# Patient Record
Sex: FEMALE | Race: WHITE | Employment: OTHER | URBAN - METROPOLITAN AREA
[De-identification: names, ages, dates, MRNs, and addresses within clinical notes are randomized per-mention and may not be internally consistent; named-entity substitution may affect disease eponyms.]

---

## 2023-01-09 NOTE — PROGRESS NOTES
Ancelmo Fisher  [address], [address] [facility], [address] [place] [address]  Phone: [phone] Fax [phone]  Yg Garcia MD      Patient: Swetha Gordillo  Provider: Yg Garcia MD    CC:   Establish Care  Parkinson's Disease  Chronic Inflammatory Demyelinating Polyneuritis       Referring Provider: Dr. Leonidas Bowen    History of Present Illness:     Swetha Gordillo is a 74 y.o. female who presents to the clinic to establish care for Parkinson's Disease (2016) and Chronic Inflammatory Demyelinating Polyneuritis (2018) as stated on the referral. Upon further review, the patient also carries the following diagnoses: disorder of the autonomic nervous system (2020), muscle weakness, REM sleep behavior disorder (2020), neuropathy (2016), depressive disorder (2016), neurogenic bladder (2020), and impaired cognition. The patient and  report that a DaTscan was previously completed, but unable to provide specifics (date, year, facility) related to the scan. Most recently, her neurologist was Dr. Ronnie Houston at [facility]. Her last visit with him appears to have been on 11/22/2021. Dr. Leonidas Bowen with [facility] has most recently been managing her care and referred the patient to this clinic for evaluation and treatment. She is accompanied by her  at today's visit. The patient reports that her brother and half sister have been diagnosed with seizures, but denies other neurological or chronic health conditions in the family. Patient and  deny any changes in cognition/memory. There is evidence of impaired cognition and awareness. The patient's  reports episodes of impulsivity (making purchases/spending money on unnecessary items); especially at night. The patient denies any memory or awareness of this behavior.  Behavior modification strategies were discussed, but patient and  were not receptive to making any changes at this time. The patient and  reported an increase in anxiety and depression recently, but overall well managed with her current medication. Cognitive behavioral therapy was discussed at length, but declined a referral at this time. Negative assessment for threat to hurt self or others. Patient denies hallucinations. The patient reports having very vivid dreams. Her  reports that she does talk in her sleep and she appears to be acting out her dreams. They report that when this happens, he wakes her up without difficulty. Denies any falls from bed to floor during sleeping hours. The patient and  report a total of 3 falls within the past 12 months; 2 resulting in injury (broken toe and sutures to one toe). On all 3 occasions, the patient was in the bathroom alone. The patient is wheelchair bound. There is muscle atrophy, weakness, and rigidity. There is minimal tremor present at rest; however, R>L. With intentional movements, distraction, or when stressful/upsetting topics are discussed, there are dyskinetic movements involving her head, neck, face, and jaw. She also stated that she has been experiencing random, but painful muscle spasms/cramps. When these occur an OTC topical cream (unsure of the name) has been applied and has been helpful. She also takes Gabapentin for neuropathic pain and does not want to change the dose or schedule at this time. Patient and  also report that all of the other medications are working well and they are not interested in making any changes at this time. Overall safety was discussed at great length. Due to the number of falls within the past 12 months, muscle atrophy, generalized weakness, and muscle rigidity a referral to home health, physical therapy, occupational therapy, speech therapy will be initiated. Suggestions for additional safety monitoring were also strongly suggested (life alert necklace/button, watch with fall detection, etc.. ).  The patient stated even if she had such a device, she would not use it, even for an emergency. She is hypophonic and reports difficulty swallowing food at times. The patient and  were receptive to a referral to speech therapy for evaluation and treatment. The patient also reports an increase in saliva especially when eating. She states that the drooling makes her self conscious and would like to try some oral drops to help with this on an as needed basis. The patient reports that she is not able to smell or taste for many years, but unable to give specifics. Patient reports chronic constipation, but is able to manage it with OTC products. The patient denies any changes to her ability to write or change in the size of her handwriting. Based on handwriting sample during the visit micrographia was not present. Patient and spouse denied any changes related to the following: energy level, interest/ability to participate in usual activities, or appetite. Relevant Medications: (Reviewed and below current list of all medications)  Current Relevant Medications:   Mirtazapine 15 mg QHS  Carbidopa ER 50mg-levodopa 200 mg tablet ER QID (8a, 12p, 4p, 9p)  Cymbalta 30mg BID  Gabapentin 100mg 2 tabs QID (takes 1 tab 3 times a day and 2 tabs before bed; same schedule as Sinemet)  Lorazepam 0.5mg 2 tabs QHS  Lidocaine transdermal patch BID  Potassium Cl 10meq ER tab 2 tab daily  Duloxetine DR 60mg 1 cap Daily  Vitamin D2 50,000IU 1 cap twice a week  Raloxifene 60mg 1 tab Daily  Levaothyroxine 0.05mg 1 tab by mouth daily    Previous Medication Trials:  Neurpro 4mg transdermal patch  Ongentys 50mg daily: did not help and cost was an issue  Rytary 36/145  Neupro 4mg transdermal patch  Neupro 6mg transdermal patch      Review of Systems:   Review of Systems   Constitutional:  Positive for fatigue. HENT:  Positive for drooling, trouble swallowing and voice change.     Respiratory:  Negative for shortness of breath. Cardiovascular:  Negative for chest pain. Gastrointestinal:  Positive for constipation. Genitourinary:  Positive for frequency and urgency. Musculoskeletal:  Positive for gait problem, neck pain and neck stiffness. Skin:  Negative for rash. Neurological:  Positive for tremors, speech difficulty and weakness. Psychiatric/Behavioral:  Positive for sleep disturbance. The patient is nervous/anxious. The patient is not hyperactive. Lab/Imaging Review:   I REVIEWED PERTINENT LABS, IMAGES, AND REPORTS WITH THE PATIENT PERSONALLY, DIRECTLY AND FULLY. THE MOST PERTINENT FINDINGS ARE NOTED BELOW:    No results found for: WBC, HGB, HCT, MCV, PLT    No results found for: CREATININE, BUN, NA, K, CL, CO2    CT Head November 2016:  Age-appropriate atrophy is noted. Patchy periventricular hypodensities most consistent with small chronic small vessel ischemic change. Past Medical History:     Past medical history, surgical history, social history, family history, medications, and allergies were reviewed and updated as appropriate. PAST MEDICAL HISTORY:  Past Medical History:   Diagnosis Date    CIDP (chronic inflammatory demyelinating polyneuropathy) ([facility])     Depression     Hypothyroidism     [facility]     RBD (REM behavioral disorder)      PAST SURGICAL HISTORY:   Past Surgical History:   Procedure Laterality Date    HAND SURGERY      TUBAL LIGATION       FAMILY HISTORY:  No family history on file.      SOCIAL HISTORY:  Social History     Socioeconomic History    Marital status:      Spouse name: None    Number of children: None    Years of education: None    Highest education level: None   Tobacco Use    Smoking status: Never     Passive exposure: Never    Smokeless tobacco: Never   Substance and Sexual Activity    Alcohol use: Never    Drug use: Never       Medications/Allergies:       Current Outpatient Medications on File Prior to Visit   Medication Sig Dispense Refill    amoxicillin-clavulanate (AUGMENTIN) 500-125 MG per tablet TAKE 1 TABLET BY MOUTH EVERY 12 HOURS FOR 7 DAYS      raloxifene (EVISTA) 60 MG tablet raloxifene 60 mg tablet      levothyroxine (SYNTHROID) 50 MCG tablet TAKE 1 TABLET BY MOUTH EVERY DAY      vitamin D (ERGOCALCIFEROL) 1.25 MG (17052 UT) CAPS capsule TAKE 1 CAPSULE BY MOUTH 2 TIMES A WEEK      potassium chloride (KLOR-CON) 10 MEQ extended release tablet potassium chloride ER 10 mEq tablet,extended release      DULoxetine (CYMBALTA) 60 MG extended release capsule duloxetine 60 mg capsule,delayed release      mirtazapine (REMERON) 15 MG tablet TAKE 1 TABLET BY MOUTH EVERY DAY BEFORE BEDTIME      carbidopa-levodopa (SINEMET CR)  MG per extended release tablet TAKE 1 TABLET BY MOUTH FOUR TIMES DAILY      gabapentin (NEURONTIN) 100 MG capsule gabapentin 100 mg capsule   TAKE 2 CAPSULES BY MOUTH FOUR TIMES DAILY      aspirin 81 MG EC tablet Take 81 mg by mouth      cyanocobalamin 1000 MCG/ML injection cyanocobalamin (vit B-12) 1,000 mcg/mL injection solution      docusate (COLACE, DULCOLAX) 100 MG CAPS   100 mg, = 1 cap, Cap, Oral, BID, 60 cap, Refills 0, Print Requisition, 0      losartan (COZAAR) 25 MG tablet losartan 25 mg tablet      LORazepam (ATIVAN) 1 MG tablet lorazepam 1 mg tablet      Magnesium 100 MG CAPS Take 1 tablet by mouth Daily with lunch      mirtazapine (REMERON) 15 MG tablet mirtazapine 15 mg tablet   TAKE 1 TABLET BY MOUTH DAILY BEFORE BEDTIME       No current facility-administered medications on file prior to visit. ALLERGIES:  No Known Allergies      Physical Exam:     /70   Wt 148 lb (67.1 kg)     General Exam:  General: Wheelchair bound elderly lady in no apparent distress  HEENT: Normocephalic, atraumatic. Sclera anicteric. Oropharynx clear. Neck: Supple without masses  Cardiovascular: Regular rate and rhythm. No carotid bruits. Lungs: Non-labored breathing. Abdomen: Soft, nontender, nondistended. Extremities: Peripheral pulses intact. No edema and no rashes. Neurological Exam:     MS/Language/Speech:  Alert. Fluctuations in cognition. Periods of impulsivity. Speech is hypophonic. Drooling while eating. Difficulty swallowing. Cranial Nerves: PERRL. Eye movements full with normal pursuits. No nystagmus. Minimal facial expression. Tongue and palate were midline. Shoulder shrug symmetric. Motor: Generalized weakness is noted. No focal weakness. There is mild to moderate rigidity both upper and lower extremities. Abnormal Movements: No resting tremor noted. Slight postural tremor with hands outstretched worse on the right as compared to the left. Dyskinetic movements involving her head, neck, face, and jaw present. Reflexes: Absent    Sensory: Sensory deficit present. Muscle spasms/cramping in trunk and bilateral upper extremities. Sensation is diminished to all modalities from the feet up to about the knees. Cerebellar: Mild ataxia and dysmetria with finger-nose-finger bilaterally, but R>L. Gait: Wheelchair bound. Assessment and Plan:     Davy Aparicio is a 74 y.o. female who presents with the following issues:     Vijay Alemanizzle was seen today for new patient. Diagnoses and all orders for this visit:    Parkinson disease ([facility]  -     External Referral to Home Health    CIDP (chronic inflammatory demyelinating polyneuropathy) ([facility])    Impaired functional mobility, balance, gait, and endurance  -     External Referral to Home Health    Cognitive deficits as late effect of cerebrovascular disease    Anxiety and depression    Hypophonia    RBD (REM behavioral disorder)    Neuropathic pain    Sialorrhea  -     atropine 1 % ophthalmic solution; Administer 1 to 2 drops sublingually every 6 hours as needed for excessive drooling. Patient presents to establish care and for further evaluation of a history of Parkinson's disease, previously followed by Dr. Tricia Pope.   Her neurologic exam is noted above which shows some other atypical features including significant dyskinetic movements particularly involving the head and jaw. Other atypical features are noted above. She reports having a prior DaTscan but we are not able to review these results. We could consider repeating it in the future should we need more diagnostic clarity. At this time we are going to continue her current dopaminergic therapy which includes Sinemet CR  mg 1 tablet 4 times a day. We could consider reductions in dopaminergic therapy to address the dyskinetic movements should they become more problematic. She would likely benefit from home health therapy services including PT, OT, and speech therapy to address her difficulties with generalized weakness and impairments in functional mobility as well as dysphagia/hypophonia. Start atropine drops for the sialorrhea. We also discussed the possibility of botulinum toxin injections. She displays some features of RBD which are not currently problematic at this time so we will continue to monitor. I will have her continue her IVIG every 2 weeks with no change to the current plan but this may need to be reassessed. She is currently receiving infusions in [facility] [address]. Referrals to pain management could also be considered in the future given her persistent neuropathic pain. Patient to follow-up in 3 months. Signature: Kimmie Palmer MD      Date:  1/16/2023    [facility]   [facility] , [address]  Ph: [phone]  Fax: [phone]         I have personally interviewed and examined Mrs. Raisa Ureña and I have personally reviewed all relevant records including labs and imaging as noted above. I have written all aspects of this note. More than 50% of this time was used for counseling regarding my diagnosis, prognosis, and plans for management. Total visit time: 80 minutes.

## 2023-01-10 ENCOUNTER — OFFICE VISIT (OUTPATIENT)
Dept: NEUROLOGY | Age: 73
End: 2023-01-10
Payer: MEDICARE

## 2023-01-10 VITALS — SYSTOLIC BLOOD PRESSURE: 118 MMHG | WEIGHT: 148 LBS | DIASTOLIC BLOOD PRESSURE: 70 MMHG

## 2023-01-10 DIAGNOSIS — G61.81 CIDP (CHRONIC INFLAMMATORY DEMYELINATING POLYNEUROPATHY) (HCC): ICD-10-CM

## 2023-01-10 DIAGNOSIS — Z74.09 IMPAIRED FUNCTIONAL MOBILITY, BALANCE, GAIT, AND ENDURANCE: ICD-10-CM

## 2023-01-10 DIAGNOSIS — M79.2 NEUROPATHIC PAIN: ICD-10-CM

## 2023-01-10 DIAGNOSIS — K11.7 SIALORRHEA: ICD-10-CM

## 2023-01-10 DIAGNOSIS — G47.52 RBD (REM BEHAVIORAL DISORDER): ICD-10-CM

## 2023-01-10 DIAGNOSIS — G20 PARKINSON DISEASE (HCC): Primary | ICD-10-CM

## 2023-01-10 DIAGNOSIS — R49.8 HYPOPHONIA: ICD-10-CM

## 2023-01-10 PROCEDURE — 99205 OFFICE O/P NEW HI 60 MIN: CPT | Performed by: PSYCHIATRY & NEUROLOGY

## 2023-01-10 PROCEDURE — G8400 PT W/DXA NO RESULTS DOC: HCPCS | Performed by: PSYCHIATRY & NEUROLOGY

## 2023-01-10 PROCEDURE — 3017F COLORECTAL CA SCREEN DOC REV: CPT | Performed by: PSYCHIATRY & NEUROLOGY

## 2023-01-10 PROCEDURE — 1036F TOBACCO NON-USER: CPT | Performed by: PSYCHIATRY & NEUROLOGY

## 2023-01-10 PROCEDURE — G8427 DOCREV CUR MEDS BY ELIG CLIN: HCPCS | Performed by: PSYCHIATRY & NEUROLOGY

## 2023-01-10 PROCEDURE — G8484 FLU IMMUNIZE NO ADMIN: HCPCS | Performed by: PSYCHIATRY & NEUROLOGY

## 2023-01-10 PROCEDURE — 1090F PRES/ABSN URINE INCON ASSESS: CPT | Performed by: PSYCHIATRY & NEUROLOGY

## 2023-01-10 PROCEDURE — G8421 BMI NOT CALCULATED: HCPCS | Performed by: PSYCHIATRY & NEUROLOGY

## 2023-01-10 PROCEDURE — 1123F ACP DISCUSS/DSCN MKR DOCD: CPT | Performed by: PSYCHIATRY & NEUROLOGY

## 2023-01-10 RX ORDER — ERGOCALCIFEROL 1.25 MG/1
CAPSULE ORAL
COMMUNITY
Start: 2022-11-15

## 2023-01-10 RX ORDER — RALOXIFENE HYDROCHLORIDE 60 MG/1
TABLET, FILM COATED ORAL
COMMUNITY
Start: 2022-07-08

## 2023-01-10 RX ORDER — ASPIRIN 81 MG/1
81 TABLET ORAL
COMMUNITY

## 2023-01-10 RX ORDER — DULOXETIN HYDROCHLORIDE 60 MG/1
CAPSULE, DELAYED RELEASE ORAL
COMMUNITY
Start: 2022-07-19

## 2023-01-10 RX ORDER — LOSARTAN POTASSIUM 25 MG/1
TABLET ORAL
COMMUNITY

## 2023-01-10 RX ORDER — MIRTAZAPINE 15 MG/1
TABLET, FILM COATED ORAL
COMMUNITY

## 2023-01-10 RX ORDER — PSEUDOEPHEDRINE HCL 30 MG
TABLET ORAL
COMMUNITY
Start: 2016-11-24

## 2023-01-10 RX ORDER — GABAPENTIN 100 MG/1
CAPSULE ORAL
COMMUNITY
Start: 2022-09-28

## 2023-01-10 RX ORDER — LEVOTHYROXINE SODIUM 0.05 MG/1
TABLET ORAL
COMMUNITY
Start: 2022-10-18

## 2023-01-10 RX ORDER — CARBIDOPA AND LEVODOPA 50; 200 MG/1; MG/1
TABLET, EXTENDED RELEASE ORAL
COMMUNITY
Start: 2022-12-21

## 2023-01-10 RX ORDER — AMOXICILLIN AND CLAVULANATE POTASSIUM 500; 125 MG/1; MG/1
TABLET, FILM COATED ORAL
COMMUNITY
Start: 2022-11-27

## 2023-01-10 RX ORDER — CYANOCOBALAMIN 1000 UG/ML
INJECTION, SOLUTION INTRAMUSCULAR; SUBCUTANEOUS
COMMUNITY
Start: 2016-11-16

## 2023-01-10 RX ORDER — POTASSIUM CHLORIDE 750 MG/1
TABLET, FILM COATED, EXTENDED RELEASE ORAL
COMMUNITY
Start: 2022-07-19

## 2023-01-10 RX ORDER — LORAZEPAM 1 MG/1
TABLET ORAL
COMMUNITY

## 2023-01-10 RX ORDER — MIRTAZAPINE 15 MG/1
TABLET, FILM COATED ORAL
COMMUNITY
Start: 2022-10-08

## 2023-01-10 ASSESSMENT — ENCOUNTER SYMPTOMS
SHORTNESS OF BREATH: 0
CONSTIPATION: 1
TROUBLE SWALLOWING: 1
VOICE CHANGE: 1

## 2023-01-16 RX ORDER — ATROPINE SULFATE 10 MG/ML
SOLUTION/ DROPS OPHTHALMIC
Qty: 5 ML | Refills: 2 | Status: SHIPPED | OUTPATIENT
Start: 2023-01-16

## 2023-02-24 ENCOUNTER — TELEPHONE (OUTPATIENT)
Dept: NEUROLOGY | Age: 73
End: 2023-02-24

## 2023-02-24 NOTE — TELEPHONE ENCOUNTER
Patient had a fall last night and has a skin tear. HH needs verbal orders for ok for wound care dressings.  Sharif with intrim [phone]

## 2023-03-29 ENCOUNTER — TELEPHONE (OUTPATIENT)
Dept: NEUROLOGY | Age: 73
End: 2023-03-29

## 2023-03-31 ENCOUNTER — TELEPHONE (OUTPATIENT)
Dept: NEUROLOGY | Age: 73
End: 2023-03-31

## 2023-03-31 NOTE — TELEPHONE ENCOUNTER
Ernie is needing signed orders, office notes, PT eval for this patient faxed to [phone] to complete authorization for power wheelchair

## 2023-04-21 ENCOUNTER — OFFICE VISIT (OUTPATIENT)
Dept: NEUROLOGY | Age: 73
End: 2023-04-21

## 2023-04-21 VITALS
SYSTOLIC BLOOD PRESSURE: 142 MMHG | HEIGHT: 69 IN | BODY MASS INDEX: 21.18 KG/M2 | DIASTOLIC BLOOD PRESSURE: 68 MMHG | HEART RATE: 70 BPM | WEIGHT: 143 LBS

## 2023-04-21 DIAGNOSIS — K11.7 SIALORRHEA: ICD-10-CM

## 2023-04-21 DIAGNOSIS — R49.8 HYPOPHONIA: ICD-10-CM

## 2023-04-21 DIAGNOSIS — G47.52 RBD (REM BEHAVIORAL DISORDER): ICD-10-CM

## 2023-04-21 DIAGNOSIS — G89.29 CHRONIC MIDLINE LOW BACK PAIN WITH BILATERAL SCIATICA: ICD-10-CM

## 2023-04-21 DIAGNOSIS — M79.2 NEUROPATHIC PAIN: ICD-10-CM

## 2023-04-21 DIAGNOSIS — M54.42 CHRONIC MIDLINE LOW BACK PAIN WITH BILATERAL SCIATICA: ICD-10-CM

## 2023-04-21 DIAGNOSIS — Z74.09 IMPAIRED FUNCTIONAL MOBILITY, BALANCE, GAIT, AND ENDURANCE: ICD-10-CM

## 2023-04-21 DIAGNOSIS — M54.41 CHRONIC MIDLINE LOW BACK PAIN WITH BILATERAL SCIATICA: ICD-10-CM

## 2023-04-21 DIAGNOSIS — G20 PARKINSON DISEASE (HCC): Primary | ICD-10-CM

## 2023-04-21 DIAGNOSIS — G61.81 CIDP (CHRONIC INFLAMMATORY DEMYELINATING POLYNEUROPATHY) (HCC): ICD-10-CM

## 2023-04-21 RX ORDER — LIDOCAINE 50 MG/G
1 PATCH TOPICAL DAILY
Qty: 30 PATCH | Refills: 5 | Status: SHIPPED | OUTPATIENT
Start: 2023-04-21 | End: 2023-10-18

## 2023-04-21 RX ORDER — LOSARTAN POTASSIUM 50 MG/1
TABLET ORAL
COMMUNITY
Start: 2023-04-19

## 2023-04-21 RX ORDER — CIPROFLOXACIN 500 MG/1
TABLET, FILM COATED ORAL
COMMUNITY
Start: 2023-04-12

## 2023-04-21 RX ORDER — ONDANSETRON 4 MG/1
TABLET, ORALLY DISINTEGRATING ORAL
COMMUNITY

## 2023-04-21 RX ORDER — PHENAZOPYRIDINE HYDROCHLORIDE 100 MG/1
TABLET, FILM COATED ORAL
COMMUNITY
Start: 2023-04-12

## 2023-04-21 ASSESSMENT — ENCOUNTER SYMPTOMS
CONSTIPATION: 1
SHORTNESS OF BREATH: 0
VOICE CHANGE: 1
TROUBLE SWALLOWING: 1

## 2023-04-21 NOTE — PROGRESS NOTES
Cristel Winn  [address], [address] [facility], [address] [place] [address]  Phone: [phone] Fax [phone]  Carmen Spring MD      Patient: Prema Perdue  Provider: Carmen Spring MD    Chief Complaint   Patient presents with    Follow-up     Parkinson disease      Referring Provider: Dr. Vinicius Rivas    History of Present Illness:     Prema Perdue is a 74 y.o. female who presents for follow-up and continued management of Parkinson's disease. She was previously managed by Dr. Samson Brantley and established care with our group in January 2023. She does have an abnormal DaTscan. She carries a diagnosis of Parkinson's disease diagnosed in approximately 2016. There has also been a history of a peripheral neuropathy with current diagnosis of CIDP, diagnosed in 2018. Her neuropathy has been generalized weakness that have greatly impaired her mobility. There is also mention of an autonomic neuropathy with a neurogenic bladder, impaired cognition, and RBD. She is accompanied by her  at today's visit. Overall no significant changes since her last visit. She has significant mobility impairment and a history of several falls in the past year. Many of these falls have occurred in the bathroom. She is essentially wheelchair-bound but does ambulate limited distances with assistance from either a walker or her  who is her primary caregiver. She has minimal tremor of the extremities at rest though there have been some tremors of the head and neck. She does note some muscle cramps and spasms in the extremities as well. She has been using an over-the-counter cream with some benefit as well as taking gabapentin for neuropathic pain. She remains on IVIG infusions every 2 weeks which she has been off for several years since her diagnosis of CIDP.     They have largely denied any significant cognitive changes although there is clear evidence of some impaired cognition with some mention of impulsivity but

## 2023-04-25 ENCOUNTER — TELEPHONE (OUTPATIENT)
Dept: NEUROLOGY | Age: 73
End: 2023-04-25

## 2023-05-10 ENCOUNTER — TELEPHONE (OUTPATIENT)
Dept: NEUROLOGY | Age: 73
End: 2023-05-10

## 2023-05-10 NOTE — TELEPHONE ENCOUNTER
[employer] called in reference to chart notes from January 10, 2023 for the patient. We faxed these chart notes to the patient's insurance company, but the request was denied due to an addendum being indicated on the paperwork but no information as to what was added to the note. They have faxed this office with a request to Please re-send the note with the information that was added and please date an initial/sign next to the added information. The return fax number is located on those documents.  For further questions, please call [phone]

## 2023-05-30 ENCOUNTER — TELEPHONE (OUTPATIENT)
Dept: NEUROLOGY | Age: 73
End: 2023-05-30

## 2023-06-05 NOTE — TELEPHONE ENCOUNTER
FYI: Called on Friday 6/2 and again on Monday 6/5. No answer but did leave voice message to call us back if needed.

## 2023-06-27 ENCOUNTER — CLINICAL DOCUMENTATION (OUTPATIENT)
Dept: NEUROLOGY | Age: 73
End: 2023-06-27

## 2023-07-21 ENCOUNTER — TELEPHONE (OUTPATIENT)
Dept: NEUROLOGY | Age: 73
End: 2023-07-21

## 2023-08-04 ENCOUNTER — OFFICE VISIT (OUTPATIENT)
Dept: NEUROLOGY | Age: 73
End: 2023-08-04
Payer: MEDICARE

## 2023-08-04 VITALS
SYSTOLIC BLOOD PRESSURE: 104 MMHG | BODY MASS INDEX: 20.38 KG/M2 | DIASTOLIC BLOOD PRESSURE: 60 MMHG | WEIGHT: 138 LBS | HEART RATE: 68 BPM | OXYGEN SATURATION: 96 %

## 2023-08-04 DIAGNOSIS — N39.41 URGE URINARY INCONTINENCE: ICD-10-CM

## 2023-08-04 DIAGNOSIS — Z74.09 IMPAIRED FUNCTIONAL MOBILITY, BALANCE, GAIT, AND ENDURANCE: ICD-10-CM

## 2023-08-04 DIAGNOSIS — K11.7 SIALORRHEA: ICD-10-CM

## 2023-08-04 DIAGNOSIS — R49.8 HYPOPHONIA: ICD-10-CM

## 2023-08-04 DIAGNOSIS — M79.2 NEUROPATHIC PAIN: ICD-10-CM

## 2023-08-04 DIAGNOSIS — G89.29 CHRONIC MIDLINE LOW BACK PAIN WITH BILATERAL SCIATICA: ICD-10-CM

## 2023-08-04 DIAGNOSIS — M54.41 CHRONIC MIDLINE LOW BACK PAIN WITH BILATERAL SCIATICA: ICD-10-CM

## 2023-08-04 DIAGNOSIS — M54.42 CHRONIC MIDLINE LOW BACK PAIN WITH BILATERAL SCIATICA: ICD-10-CM

## 2023-08-04 DIAGNOSIS — G61.81 CIDP (CHRONIC INFLAMMATORY DEMYELINATING POLYNEUROPATHY) (HCC): ICD-10-CM

## 2023-08-04 DIAGNOSIS — R63.0 ANOREXIA: ICD-10-CM

## 2023-08-04 DIAGNOSIS — G47.52 RBD (REM BEHAVIORAL DISORDER): ICD-10-CM

## 2023-08-04 DIAGNOSIS — G20 PARKINSON'S DISEASE (HCC): Primary | ICD-10-CM

## 2023-08-04 PROCEDURE — G8400 PT W/DXA NO RESULTS DOC: HCPCS | Performed by: PSYCHIATRY & NEUROLOGY

## 2023-08-04 PROCEDURE — 1090F PRES/ABSN URINE INCON ASSESS: CPT | Performed by: PSYCHIATRY & NEUROLOGY

## 2023-08-04 PROCEDURE — G8420 CALC BMI NORM PARAMETERS: HCPCS | Performed by: PSYCHIATRY & NEUROLOGY

## 2023-08-04 PROCEDURE — 1036F TOBACCO NON-USER: CPT | Performed by: PSYCHIATRY & NEUROLOGY

## 2023-08-04 PROCEDURE — 3017F COLORECTAL CA SCREEN DOC REV: CPT | Performed by: PSYCHIATRY & NEUROLOGY

## 2023-08-04 PROCEDURE — 1123F ACP DISCUSS/DSCN MKR DOCD: CPT | Performed by: PSYCHIATRY & NEUROLOGY

## 2023-08-04 PROCEDURE — 99215 OFFICE O/P EST HI 40 MIN: CPT | Performed by: PSYCHIATRY & NEUROLOGY

## 2023-08-04 PROCEDURE — G8427 DOCREV CUR MEDS BY ELIG CLIN: HCPCS | Performed by: PSYCHIATRY & NEUROLOGY

## 2023-08-04 PROCEDURE — 0509F URINE INCON PLAN DOCD: CPT | Performed by: PSYCHIATRY & NEUROLOGY

## 2023-08-04 RX ORDER — DRONABINOL 2.5 MG/1
2.5 CAPSULE ORAL
Qty: 60 CAPSULE | Refills: 2 | Status: SHIPPED | OUTPATIENT
Start: 2023-08-04 | End: 2023-11-02

## 2023-08-04 RX ORDER — BACLOFEN 10 MG/1
TABLET ORAL
COMMUNITY
Start: 2023-08-02

## 2023-08-04 ASSESSMENT — ENCOUNTER SYMPTOMS
TROUBLE SWALLOWING: 1
SHORTNESS OF BREATH: 0
CONSTIPATION: 1
VOICE CHANGE: 1

## 2023-08-04 NOTE — PROGRESS NOTES
Veena Christie  [address], [address] [facility], [address]  Phone: [phone] Fax [phone]  Jessica Jasso MD      Patient: Candido Dickson  Provider: Jessica Jasso MD    Chief Complaint   Patient presents with    Follow-up     3 month follow up     Referring Provider: Dr. Anthony Wilson    History of Present Illness:     Candido Dickson is a 74 y.o. female who presents for follow-up and continued management of Parkinson's disease. She was previously managed by Dr. Kristi Serrano and established care with our group in January 2023. She does have an abnormal DaTscan. She carries a diagnosis of Parkinson's disease diagnosed in approximately 2016. There has also been a history of a peripheral neuropathy with current diagnosis of CIDP, diagnosed in 2018. Her neuropathy has been generalized weakness that have greatly impaired her mobility. There is also mention of an autonomic neuropathy with a neurogenic bladder, impaired cognition, and RBD. She is accompanied by her  at today's visit. Overall no significant changes since her last visit. She continues to have significant mobility impairment and a history of several falls in the past year. Many of these falls have occurred in the bathroom. She is essentially wheelchair-bound but does ambulate limited distances with assistance from either a walker or her  who is her primary caregiver. She has minimal tremor of the extremities at rest though there have been some tremors of the head and neck. She does note some muscle cramps and spasms in the extremities as well. She has been using an over-the-counter cream with some benefit as well as taking gabapentin for neuropathic pain. She remains on IVIG infusions every 2 weeks which she has been off for several years since her diagnosis of CIDP. We had attempted to get nerve conduction studies for follow-up since her last visit but the appointment was canceled.   We have discussed the

## 2023-08-04 NOTE — PATIENT INSTRUCTIONS
Referral to [facility]rology. If this does not work then we will consider Manpreet. Consider over-the-counter docusate (Colace) to help with the constipation. Over-the-counter MiraLAX daily can also be an option. Prescription for marinol (dronabinol) 2.5 mg capsules. Take 1 capsule twice a day before meals (breakfast and dinner). Consider boost or Ensure shakes to help supplement calories. We will send an order to have you infusion center to decrease infusions to every 3 weeks.

## 2023-08-08 ENCOUNTER — TELEPHONE (OUTPATIENT)
Dept: NEUROLOGY | Age: 73
End: 2023-08-08

## 2023-08-08 NOTE — TELEPHONE ENCOUNTER
Returned phone call to Eladio at [facility]. She wanted to know if Dr. Zahida Jon was going to send a new IVIG order to decrease frequency of infusions. I let her know that Dr. Zahida Jon discussed the possibility of decreasing infusions, but ordering physician would need to make that call. She will contact ordering physician to discuss.

## 2023-08-09 ENCOUNTER — TELEPHONE (OUTPATIENT)
Dept: NEUROLOGY | Age: 73
End: 2023-08-09

## 2023-08-09 NOTE — TELEPHONE ENCOUNTER
Elva Corrigan from [facility] is calling to see if you will send new order for IVIG. Your last note discussed the possibility of doing her infusions every 3 weeks instead of every 2 weeks. Will you send new order for patient?

## 2023-08-30 ENCOUNTER — TELEPHONE (OUTPATIENT)
Dept: NEUROLOGY | Age: 73
End: 2023-08-30

## 2023-08-30 NOTE — TELEPHONE ENCOUNTER
Sosa Naveedzeny called and left a  message. She is experiencing a lot of pain in her legs. She just can't stand it any longer an is hoping you can help. She doesn't know what else she can do. Please call. You may need to talk with Andrew Nicholas because she is having difficulty speaking.

## 2023-08-31 NOTE — TELEPHONE ENCOUNTER
SILVANO: Discussed with patient. She is going to reach out to her pain management provider who has really been the front person for her chronic pain. We did discuss the possibility of moving her IVIG infusions back to every 2 weeks (currently at 3 weeks) if it is felt that spacing them further apart has worsened her neuropathy pain. She will keep that in mind and I have asked her to keep us posted.        Jennifer Mcmullen MD  [address] [facility]  [address], [address], [address] [place] [address]  Ph: [phone]  Fax: [phone]

## 2023-09-20 ENCOUNTER — TELEPHONE (OUTPATIENT)
Dept: UROLOGY | Age: 73
End: 2023-09-20

## 2023-09-20 NOTE — TELEPHONE ENCOUNTER
Pt called wanted to cancel 9/21 @ 930 appointment and resched for next Wed. Pt hung up before we had a chance to sched. Please call pt.

## 2024-01-22 ENCOUNTER — TELEPHONE (OUTPATIENT)
Age: 74
End: 2024-01-22

## 2024-01-22 NOTE — TELEPHONE ENCOUNTER
Patient called stating she was recently admitted to the ER for high blood pressure. She is scheduled for an IVIG infusion 1/23 and wants to know if it is safe to still go for her infusion with her blood pressure being elevated.     Please call her at [phone] at your earliest opportunity.     Thanks!

## 2024-01-23 ENCOUNTER — OFFICE VISIT (OUTPATIENT)
Dept: NEUROLOGY | Age: 74
End: 2024-01-23

## 2024-01-23 VITALS
SYSTOLIC BLOOD PRESSURE: 132 MMHG | WEIGHT: 147 LBS | BODY MASS INDEX: 21.71 KG/M2 | OXYGEN SATURATION: 97 % | HEART RATE: 72 BPM | DIASTOLIC BLOOD PRESSURE: 85 MMHG

## 2024-01-23 DIAGNOSIS — M54.42 CHRONIC MIDLINE LOW BACK PAIN WITH BILATERAL SCIATICA: ICD-10-CM

## 2024-01-23 DIAGNOSIS — K11.7 SIALORRHEA: ICD-10-CM

## 2024-01-23 DIAGNOSIS — G61.81 CIDP (CHRONIC INFLAMMATORY DEMYELINATING POLYNEUROPATHY) (HCC): ICD-10-CM

## 2024-01-23 DIAGNOSIS — G20.B2 PARKINSON'S DISEASE WITH DYSKINESIA AND FLUCTUATING MANIFESTATIONS: Primary | ICD-10-CM

## 2024-01-23 DIAGNOSIS — M79.2 NEUROPATHIC PAIN: ICD-10-CM

## 2024-01-23 DIAGNOSIS — M54.41 CHRONIC MIDLINE LOW BACK PAIN WITH BILATERAL SCIATICA: ICD-10-CM

## 2024-01-23 DIAGNOSIS — F41.9 ANXIETY: ICD-10-CM

## 2024-01-23 DIAGNOSIS — Z74.09 IMPAIRED FUNCTIONAL MOBILITY, BALANCE, GAIT, AND ENDURANCE: ICD-10-CM

## 2024-01-23 DIAGNOSIS — G47.52 RBD (REM BEHAVIORAL DISORDER): ICD-10-CM

## 2024-01-23 DIAGNOSIS — G89.29 CHRONIC MIDLINE LOW BACK PAIN WITH BILATERAL SCIATICA: ICD-10-CM

## 2024-01-23 RX ORDER — LORAZEPAM 1 MG/1
1 TABLET ORAL 2 TIMES DAILY PRN
Qty: 60 TABLET | Refills: 5 | Status: SHIPPED | OUTPATIENT
Start: 2024-01-23 | End: 2024-07-21

## 2024-01-23 RX ORDER — CARBIDOPA AND LEVODOPA 50; 200 MG/1; MG/1
1 TABLET, EXTENDED RELEASE ORAL 4 TIMES DAILY
Qty: 360 TABLET | Refills: 3 | Status: SHIPPED | OUTPATIENT
Start: 2024-01-23

## 2024-01-23 ASSESSMENT — PATIENT HEALTH QUESTIONNAIRE - PHQ9
2. FEELING DOWN, DEPRESSED OR HOPELESS: 1
SUM OF ALL RESPONSES TO PHQ QUESTIONS 1-9: 1
SUM OF ALL RESPONSES TO PHQ9 QUESTIONS 1 & 2: 1
SUM OF ALL RESPONSES TO PHQ QUESTIONS 1-9: 1
SUM OF ALL RESPONSES TO PHQ QUESTIONS 1-9: 1
1. LITTLE INTEREST OR PLEASURE IN DOING THINGS: 0
SUM OF ALL RESPONSES TO PHQ QUESTIONS 1-9: 1

## 2024-01-23 ASSESSMENT — ENCOUNTER SYMPTOMS
SHORTNESS OF BREATH: 0
VOICE CHANGE: 1
TROUBLE SWALLOWING: 1
CONSTIPATION: 1

## 2024-01-23 NOTE — PROGRESS NOTES
[facility]  [address], [address]  [place] [address]  Phone: [phone] Fax [phone]  Leonidas Palma MD      Patient: Yessenia Stallworth  Provider: Leonidas Palma MD    Chief Complaint   Patient presents with    Follow-up     Parkinson's Disease-would like to talk about gabapentin prescription     Referring Provider: Dr. Segovia Dblaurak    History of Present Illness:     Yessenia Stallworth is a 73 y.o. female who presents for follow-up and continued management of Parkinson's disease.  She was previously managed by Dr. Kelly and established care with our group in January 2023.  She does have an abnormal DaTscan.    She carries a diagnosis of Parkinson's disease diagnosed in approximately 2016.  There has also been a history of a peripheral neuropathy with current diagnosis of CIDP, diagnosed in 2018.  Her neuropathy has been generalized weakness that have greatly impaired her mobility.  There is also mention of an autonomic neuropathy with a neurogenic bladder, impaired cognition, and RBD.    She is accompanied by her  at today's visit.  She was last seen August 2023.    A recent hospitalization at [facility] in early January for syncope.  She was found to have orthostatic vital signs and received IV fluid resuscitation.  Ultimately attributed to autonomic dysfunction.  She was discharged home without any home health services.    Because of the admission, her IVIG infusions were pushed back to next week.  She is currently receiving IVIG infusions every 3 weeks for her diagnosis of CIDP.  Recall that previously she was receiving them every 2 weeks and had been doing this for several years and ultimately we felt that she may no longer require such aggressive therapy.  It does not appear that the frequency change has had any real profound change in her clinical condition.    She continues to have significant mobility impairment with history of falls.  She did ultimately receive a power chair.  She is only

## 2024-01-23 NOTE — TELEPHONE ENCOUNTER
FYI: Called but straight to voicemail.  Left voice message that okay to get IVIG infusion as long as she is feeling okay.  She has an appointment scheduled for later today.

## 2024-03-01 ENCOUNTER — TELEPHONE (OUTPATIENT)
Dept: NEUROLOGY | Age: 74
End: 2024-03-01

## 2024-03-01 NOTE — TELEPHONE ENCOUNTER
Patient was getting infusions every 3 weeks but it was changed to every 4 weeks but she has been having awful pain in her legs the past couple of days. She is wanting the infusions to go back to every 3 weeks.

## 2024-05-14 ENCOUNTER — TELEPHONE (OUTPATIENT)
Dept: NEUROLOGY | Age: 74
End: 2024-05-14

## 2024-05-14 NOTE — TELEPHONE ENCOUNTER
Patients  called to let you know that patient is currently admitted to [facility]. For a week she was passing out. He called EMS 3 times. The first 2 times by the time she got to the hospital, she was awake and alert. On Friday night she passed out again and he couldn't wake her. He called EMS and when she got to the hospital they couldn't wake her. She was not responding to any stimulants. She was pretty much like that all weekend. They said it looked like she was having seizures, she urinated herself. They started her on seizure meds and she is starting to come too.  said her bp was dropping but now the last couple of times she passed out her bp was good. All her recs from [facility] are in care everywhere.

## 2024-05-15 NOTE — TELEPHONE ENCOUNTER
Let patient know that I am able to review the records and will be following along.      She already has follow-up arranged, though if it needs to be rescheduled due to delayed discharge (or if she requires rehab stay) then we can reschedule if needed.

## 2024-06-02 NOTE — PROGRESS NOTES
up to twice a day as needed for breakthrough anxiety attacks.    Sialorrhea:  We have discussed the possibility of Botox injections but symptoms remain stable.  Continue to monitor.      Patient to follow-up in 3 months.       Signature: Leonidas Palma MD      Date:  6/4/2024    [address], [place] [address]  Ph: [phone]  Fax: [phone]         I have personally interviewed and examined Mrs. Yessenia Stallworth and I have personally reviewed all relevant records including labs and imaging as noted above. I have written all aspects of this note. More than 50% of this time was used for counseling regarding my diagnosis, prognosis, and plans for management. Total visit time: 45 minutes.

## 2024-06-03 ENCOUNTER — OFFICE VISIT (OUTPATIENT)
Dept: NEUROLOGY | Age: 74
End: 2024-06-03
Payer: MEDICARE

## 2024-06-03 VITALS
HEART RATE: 72 BPM | SYSTOLIC BLOOD PRESSURE: 83 MMHG | WEIGHT: 147 LBS | OXYGEN SATURATION: 97 % | DIASTOLIC BLOOD PRESSURE: 54 MMHG | BODY MASS INDEX: 21.71 KG/M2

## 2024-06-03 DIAGNOSIS — G20.C PRIMARY PARKINSONISM (HCC): Primary | ICD-10-CM

## 2024-06-03 DIAGNOSIS — M79.2 NEUROPATHIC PAIN: ICD-10-CM

## 2024-06-03 DIAGNOSIS — G61.81 CIDP (CHRONIC INFLAMMATORY DEMYELINATING POLYNEUROPATHY) (HCC): ICD-10-CM

## 2024-06-03 DIAGNOSIS — Z74.09 IMPAIRED FUNCTIONAL MOBILITY, BALANCE, GAIT, AND ENDURANCE: ICD-10-CM

## 2024-06-03 DIAGNOSIS — F41.9 ANXIETY: ICD-10-CM

## 2024-06-03 DIAGNOSIS — I95.1 ORTHOSTATIC HYPOTENSION: ICD-10-CM

## 2024-06-03 PROCEDURE — 99215 OFFICE O/P EST HI 40 MIN: CPT | Performed by: PSYCHIATRY & NEUROLOGY

## 2024-06-03 PROCEDURE — 3017F COLORECTAL CA SCREEN DOC REV: CPT | Performed by: PSYCHIATRY & NEUROLOGY

## 2024-06-03 PROCEDURE — G8427 DOCREV CUR MEDS BY ELIG CLIN: HCPCS | Performed by: PSYCHIATRY & NEUROLOGY

## 2024-06-03 PROCEDURE — 1090F PRES/ABSN URINE INCON ASSESS: CPT | Performed by: PSYCHIATRY & NEUROLOGY

## 2024-06-03 PROCEDURE — 1123F ACP DISCUSS/DSCN MKR DOCD: CPT | Performed by: PSYCHIATRY & NEUROLOGY

## 2024-06-03 PROCEDURE — 1036F TOBACCO NON-USER: CPT | Performed by: PSYCHIATRY & NEUROLOGY

## 2024-06-03 PROCEDURE — G8400 PT W/DXA NO RESULTS DOC: HCPCS | Performed by: PSYCHIATRY & NEUROLOGY

## 2024-06-03 PROCEDURE — G8420 CALC BMI NORM PARAMETERS: HCPCS | Performed by: PSYCHIATRY & NEUROLOGY

## 2024-06-03 RX ORDER — DROXIDOPA 100 MG/1
100 CAPSULE ORAL 3 TIMES DAILY
COMMUNITY
Start: 2024-05-29

## 2024-06-03 RX ORDER — HYDRALAZINE HYDROCHLORIDE 10 MG/1
10 TABLET, FILM COATED ORAL 3 TIMES DAILY
COMMUNITY
Start: 2024-05-24

## 2024-06-03 ASSESSMENT — PATIENT HEALTH QUESTIONNAIRE - PHQ9
SUM OF ALL RESPONSES TO PHQ QUESTIONS 1-9: 0
1. LITTLE INTEREST OR PLEASURE IN DOING THINGS: NOT AT ALL
SUM OF ALL RESPONSES TO PHQ QUESTIONS 1-9: 0
SUM OF ALL RESPONSES TO PHQ QUESTIONS 1-9: 0
2. FEELING DOWN, DEPRESSED OR HOPELESS: NOT AT ALL
DEPRESSION UNABLE TO ASSESS: FUNCTIONAL CAPACITY MOTIVATION LIMITS ACCURACY
SUM OF ALL RESPONSES TO PHQ9 QUESTIONS 1 & 2: 0
SUM OF ALL RESPONSES TO PHQ QUESTIONS 1-9: 0

## 2024-08-07 ENCOUNTER — TELEPHONE (OUTPATIENT)
Dept: NEUROLOGY | Age: 74
End: 2024-08-07